# Patient Record
Sex: MALE | Race: WHITE | URBAN - METROPOLITAN AREA
[De-identification: names, ages, dates, MRNs, and addresses within clinical notes are randomized per-mention and may not be internally consistent; named-entity substitution may affect disease eponyms.]

---

## 2022-10-05 ENCOUNTER — OFFICE VISIT (OUTPATIENT)
Dept: URBAN - METROPOLITAN AREA CLINIC 64 | Facility: CLINIC | Age: 77
End: 2022-10-05
Payer: MEDICARE

## 2022-10-05 DIAGNOSIS — H52.4 PRESBYOPIA: ICD-10-CM

## 2022-10-05 DIAGNOSIS — H16.223 KERATOCONJUNCTIVITIS SICCA, BILATERAL: Primary | ICD-10-CM

## 2022-10-05 PROCEDURE — 92004 COMPRE OPH EXAM NEW PT 1/>: CPT | Performed by: OPTOMETRIST

## 2022-10-05 ASSESSMENT — INTRAOCULAR PRESSURE
OD: 15
OS: 13

## 2022-10-05 ASSESSMENT — VISUAL ACUITY
OD: 20/20
OS: 20/25

## 2022-10-05 ASSESSMENT — KERATOMETRY
OS: 43.16
OD: 42.76

## 2022-10-05 NOTE — IMPRESSION/PLAN
Impression: Keratoconjunctivitis sicca, bilateral: E76.365. Plan: Patient's complaint is consistent with dry eye syndrome. There is no evidence of permanent changes to the cornea. Explained there are ways to improve the symptoms but no permanent fix for the symptoms. Reviewed options including topical treatments, punctal plugs and permanent punctal occlusion.

## 2023-01-09 ENCOUNTER — OFFICE VISIT (OUTPATIENT)
Dept: URBAN - METROPOLITAN AREA CLINIC 64 | Facility: CLINIC | Age: 78
End: 2023-01-09
Payer: MEDICARE

## 2023-01-09 DIAGNOSIS — H35.379 PUCKERING OF MACULA, UNSPECIFIED EYE: Primary | ICD-10-CM

## 2023-01-09 PROCEDURE — 99213 OFFICE O/P EST LOW 20 MIN: CPT | Performed by: OPTOMETRIST

## 2023-01-09 PROCEDURE — 92134 CPTRZ OPH DX IMG PST SGM RTA: CPT | Performed by: OPTOMETRIST

## 2023-01-09 RX ORDER — VIT E/DHA/LUT/ZEAX/ASTAX/BILB 25-220-5
CAPSULE ORAL
Qty: 1 | Refills: 0 | Status: ACTIVE
Start: 2023-01-09

## 2023-01-09 ASSESSMENT — INTRAOCULAR PRESSURE
OS: 17
OD: 16

## 2023-01-09 NOTE — IMPRESSION/PLAN
Impression: Puckering of macula, unspecified eye: H35.379. Plan: Noted on Mac OCT OU. Discussed diagnosis in detail with patient. Discussed treatment options with patient. Recommend consult with Dr. Richard Stein next available. H/O PPV macular pucker OU.

## 2023-05-04 ENCOUNTER — OFFICE VISIT (OUTPATIENT)
Dept: URBAN - METROPOLITAN AREA CLINIC 64 | Facility: CLINIC | Age: 78
End: 2023-05-04
Payer: MEDICARE

## 2023-05-04 DIAGNOSIS — H35.379 PUCKERING OF MACULA, UNSPECIFIED EYE: Primary | ICD-10-CM

## 2023-05-04 DIAGNOSIS — H16.223 KERATOCONJUNCTIVITIS SICCA, BILATERAL: ICD-10-CM

## 2023-05-04 DIAGNOSIS — H43.813 VITREOUS DEGENERATION, BILATERAL: ICD-10-CM

## 2023-05-04 PROCEDURE — 92134 CPTRZ OPH DX IMG PST SGM RTA: CPT | Performed by: OPHTHALMOLOGY

## 2023-05-04 PROCEDURE — 99204 OFFICE O/P NEW MOD 45 MIN: CPT | Performed by: OPHTHALMOLOGY

## 2023-05-04 ASSESSMENT — INTRAOCULAR PRESSURE
OD: 21
OS: 23

## 2023-05-04 NOTE — IMPRESSION/PLAN
Impression: Puckering of macula PEELED
  NO RECUR / NO CME Plan:   H/O PPV macular pucker OU prior surgery (2018) -- Elsewhere (RECORDS REQUESTED from [facility]) -- NO RECUR ERM / NO CME -- mild irreg surface OU unable to repair / adjust / fix. This is what it is and no addt'l surgery indicated. ERM peeled / NO recur / NO CME -- mild irreg s/p peel   s. quo       RETINA PRN.

## 2023-05-04 NOTE — IMPRESSION/PLAN
Impression:  K sicca, / PCIOL / Floaters Plan:  Patient's complaint is consistent with dry eye syndrome. K sicca, / PCIOL / Floaters and all other gen eye care w gen eye team. . . . non-retinal issues.

## 2023-05-08 ENCOUNTER — OFFICE VISIT (OUTPATIENT)
Dept: URBAN - METROPOLITAN AREA CLINIC 64 | Facility: CLINIC | Age: 78
End: 2023-05-08
Payer: MEDICARE

## 2023-05-08 DIAGNOSIS — H16.223 KERATOCONJUNCTIVITIS SICCA, BILATERAL: Primary | ICD-10-CM

## 2023-05-08 DIAGNOSIS — H35.371 PUCKERING OF MACULA, RIGHT EYE: ICD-10-CM

## 2023-05-08 DIAGNOSIS — H52.4 PRESBYOPIA: ICD-10-CM

## 2023-05-08 DIAGNOSIS — Z96.1 PRESENCE OF PSEUDOPHAKIA: ICD-10-CM

## 2023-05-08 PROCEDURE — 99213 OFFICE O/P EST LOW 20 MIN: CPT | Performed by: OPTOMETRIST

## 2023-05-08 ASSESSMENT — INTRAOCULAR PRESSURE
OS: 14
OD: 15

## 2023-05-08 NOTE — IMPRESSION/PLAN
Impression: Puckering of macula, right eye: H35.371. Plan: H/O retinal peel in 2018. Discussed with patient that it could be limiting [facility]. Monitor yearly.

## 2023-05-08 NOTE — IMPRESSION/PLAN
Impression: Keratoconjunctivitis sicca, bilateral: Z44.092. Plan: Discussed diagnosis in detail with patient. New glasses Rx was given today. Recommend yearly exams.

## 2023-05-08 NOTE — IMPRESSION/PLAN
Impression: Presbyopia: H52.4. Plan: Patient prefers to wait for glasses. Discussed with patient that glasses should help improve vision.

## 2023-05-30 ENCOUNTER — OFFICE VISIT (OUTPATIENT)
Dept: URBAN - METROPOLITAN AREA CLINIC 64 | Facility: CLINIC | Age: 78
End: 2023-05-30

## 2023-05-30 DIAGNOSIS — H52.4 PRESBYOPIA: Primary | ICD-10-CM

## 2023-05-30 PROCEDURE — 92015 DETERMINE REFRACTIVE STATE: CPT | Performed by: OPTOMETRIST

## 2023-05-30 ASSESSMENT — VISUAL ACUITY
OS: 20/20
OD: 20/20

## 2024-06-06 ENCOUNTER — OFFICE VISIT (OUTPATIENT)
Dept: URBAN - METROPOLITAN AREA CLINIC 64 | Facility: LOCATION | Age: 79
End: 2024-06-06
Payer: MEDICARE

## 2024-06-06 DIAGNOSIS — Z96.1 PRESENCE OF PSEUDOPHAKIA: ICD-10-CM

## 2024-06-06 DIAGNOSIS — H52.4 PRESBYOPIA: ICD-10-CM

## 2024-06-06 DIAGNOSIS — H35.373 PUCKERING OF MACULA, BILATERAL: ICD-10-CM

## 2024-06-06 DIAGNOSIS — H47.323 DRUSEN OF OPTIC DISC, BILATERAL: Primary | ICD-10-CM

## 2024-06-06 PROCEDURE — 99214 OFFICE O/P EST MOD 30 MIN: CPT | Performed by: OPTOMETRIST

## 2024-06-06 ASSESSMENT — INTRAOCULAR PRESSURE
OD: 18
OS: 18
OS: 21

## 2025-01-09 ENCOUNTER — OFFICE VISIT (OUTPATIENT)
Dept: URBAN - METROPOLITAN AREA CLINIC 64 | Facility: LOCATION | Age: 80
End: 2025-01-09
Payer: MEDICARE

## 2025-01-09 DIAGNOSIS — H35.373 PUCKERING OF MACULA, BILATERAL: Primary | ICD-10-CM

## 2025-01-09 PROCEDURE — 99214 OFFICE O/P EST MOD 30 MIN: CPT | Performed by: OPTOMETRIST

## 2025-01-09 PROCEDURE — 92134 CPTRZ OPH DX IMG PST SGM RTA: CPT | Performed by: OPTOMETRIST

## 2025-01-09 ASSESSMENT — INTRAOCULAR PRESSURE
OD: 17
OS: 17

## 2025-07-02 ENCOUNTER — OFFICE VISIT (OUTPATIENT)
Dept: URBAN - METROPOLITAN AREA CLINIC 64 | Facility: LOCATION | Age: 80
End: 2025-07-02
Payer: MEDICARE

## 2025-07-02 DIAGNOSIS — H47.323 DRUSEN OF OPTIC DISC, BILATERAL: ICD-10-CM

## 2025-07-02 DIAGNOSIS — Z96.1 PRESENCE OF INTRAOCULAR LENS: ICD-10-CM

## 2025-07-02 DIAGNOSIS — B88.0 DERMATITIS DUE TO DEMODEX SPECIES: Primary | ICD-10-CM

## 2025-07-02 DIAGNOSIS — H35.373 PUCKERING OF MACULA, BILATERAL: ICD-10-CM

## 2025-07-02 PROCEDURE — 92133 CPTRZD OPH DX IMG PST SGM ON: CPT | Performed by: OPTOMETRIST

## 2025-07-02 PROCEDURE — 99214 OFFICE O/P EST MOD 30 MIN: CPT | Performed by: OPTOMETRIST

## 2025-07-02 RX ORDER — LOTILANER OPHTHALMIC SOLUTION 2.5 MG/ML
0.25 % SOLUTION/ DROPS OPHTHALMIC
Qty: 10 | Refills: 2 | Status: INACTIVE
Start: 2025-07-02 | End: 2025-08-12

## 2025-07-02 ASSESSMENT — INTRAOCULAR PRESSURE
OS: 14
OD: 16